# Patient Record
Sex: MALE | Race: WHITE | Employment: OTHER | URBAN - METROPOLITAN AREA
[De-identification: names, ages, dates, MRNs, and addresses within clinical notes are randomized per-mention and may not be internally consistent; named-entity substitution may affect disease eponyms.]

---

## 2018-01-01 ENCOUNTER — HOSPICE ADMISSION (OUTPATIENT)
Dept: HOSPICE | Facility: HOSPICE | Age: 83
End: 2018-01-01
Payer: MEDICARE

## 2018-01-01 ENCOUNTER — HOSPITAL ENCOUNTER (INPATIENT)
Age: 83
LOS: 2 days | End: 2018-08-22
Attending: INTERNAL MEDICINE | Admitting: INTERNAL MEDICINE

## 2018-01-01 VITALS
DIASTOLIC BLOOD PRESSURE: 62 MMHG | RESPIRATION RATE: 16 BRPM | TEMPERATURE: 98 F | HEART RATE: 71 BPM | SYSTOLIC BLOOD PRESSURE: 76 MMHG

## 2018-01-01 PROCEDURE — 0656 HSPC GENERAL INPATIENT

## 2018-01-01 PROCEDURE — 3336500001 HSPC ELECTION

## 2018-01-01 PROCEDURE — 74011250636 HC RX REV CODE- 250/636: Performed by: NURSE PRACTITIONER

## 2018-01-01 PROCEDURE — 74011000250 HC RX REV CODE- 250: Performed by: NURSE PRACTITIONER

## 2018-01-01 RX ORDER — FACIAL-BODY WIPES
10 EACH TOPICAL AS NEEDED
Status: DISCONTINUED | OUTPATIENT
Start: 2018-01-01 | End: 2018-01-01 | Stop reason: HOSPADM

## 2018-01-01 RX ORDER — ACETAMINOPHEN 650 MG/1
650 SUPPOSITORY RECTAL
Status: DISCONTINUED | OUTPATIENT
Start: 2018-01-01 | End: 2018-01-01 | Stop reason: HOSPADM

## 2018-01-01 RX ORDER — WARFARIN 1 MG/1
1 TABLET ORAL DAILY
COMMUNITY

## 2018-01-01 RX ORDER — METOPROLOL TARTRATE 25 MG/1
25 TABLET, FILM COATED ORAL 2 TIMES DAILY
COMMUNITY

## 2018-01-01 RX ORDER — FUROSEMIDE 20 MG/1
20 TABLET ORAL DAILY
COMMUNITY

## 2018-01-01 RX ORDER — LORAZEPAM 2 MG/ML
1 INJECTION INTRAMUSCULAR
Status: DISCONTINUED | OUTPATIENT
Start: 2018-01-01 | End: 2018-01-01 | Stop reason: HOSPADM

## 2018-01-01 RX ORDER — HALOPERIDOL 5 MG/ML
2 INJECTION INTRAMUSCULAR
Status: DISCONTINUED | OUTPATIENT
Start: 2018-01-01 | End: 2018-01-01 | Stop reason: HOSPADM

## 2018-01-01 RX ORDER — SODIUM CHLORIDE 0.9 % (FLUSH) 0.9 %
3 SYRINGE (ML) INJECTION AS NEEDED
Status: DISCONTINUED | OUTPATIENT
Start: 2018-01-01 | End: 2018-01-01 | Stop reason: HOSPADM

## 2018-01-01 RX ORDER — MORPHINE SULFATE 2 MG/ML
2 INJECTION, SOLUTION INTRAMUSCULAR; INTRAVENOUS
Status: DISCONTINUED | OUTPATIENT
Start: 2018-01-01 | End: 2018-01-01 | Stop reason: HOSPADM

## 2018-01-01 RX ORDER — SODIUM CHLORIDE 0.9 % (FLUSH) 0.9 %
3 SYRINGE (ML) INJECTION EVERY 12 HOURS
Status: DISCONTINUED | OUTPATIENT
Start: 2018-01-01 | End: 2018-01-01 | Stop reason: HOSPADM

## 2018-01-01 RX ORDER — LORAZEPAM 2 MG/ML
2 INJECTION INTRAMUSCULAR
Status: DISCONTINUED | OUTPATIENT
Start: 2018-01-01 | End: 2018-01-01 | Stop reason: HOSPADM

## 2018-01-01 RX ORDER — GLYCOPYRROLATE 0.2 MG/ML
0.2 INJECTION INTRAMUSCULAR; INTRAVENOUS
Status: DISCONTINUED | OUTPATIENT
Start: 2018-01-01 | End: 2018-01-01 | Stop reason: HOSPADM

## 2018-01-01 RX ADMIN — LORAZEPAM 1 MG: 2 INJECTION, SOLUTION INTRAMUSCULAR; INTRAVENOUS at 06:20

## 2018-01-01 RX ADMIN — SODIUM CHLORIDE, PRESERVATIVE FREE 3 ML: 5 INJECTION INTRAVENOUS at 10:47

## 2018-01-01 RX ADMIN — MORPHINE SULFATE 2 MG: 2 INJECTION, SOLUTION INTRAMUSCULAR; INTRAVENOUS at 10:43

## 2018-01-01 RX ADMIN — LORAZEPAM 1 MG: 2 INJECTION, SOLUTION INTRAMUSCULAR; INTRAVENOUS at 11:09

## 2018-01-01 RX ADMIN — SODIUM CHLORIDE, PRESERVATIVE FREE 3 ML: 5 INJECTION INTRAVENOUS at 21:55

## 2018-01-01 RX ADMIN — LORAZEPAM 1 MG: 2 INJECTION, SOLUTION INTRAMUSCULAR; INTRAVENOUS at 17:19

## 2018-01-01 RX ADMIN — HALOPERIDOL LACTATE 2 MG: 5 INJECTION INTRAMUSCULAR at 18:19

## 2018-01-01 RX ADMIN — SODIUM CHLORIDE, PRESERVATIVE FREE 3 ML: 5 INJECTION INTRAVENOUS at 02:49

## 2018-01-01 RX ADMIN — MORPHINE SULFATE 2 MG: 2 INJECTION, SOLUTION INTRAMUSCULAR; INTRAVENOUS at 17:18

## 2018-01-01 RX ADMIN — MORPHINE SULFATE 2 MG: 2 INJECTION, SOLUTION INTRAMUSCULAR; INTRAVENOUS at 06:21

## 2018-01-01 RX ADMIN — GLYCOPYRROLATE 0.2 MG: 0.2 INJECTION INTRAMUSCULAR; INTRAVENOUS at 10:42

## 2018-01-01 RX ADMIN — MORPHINE SULFATE 2 MG: 2 INJECTION, SOLUTION INTRAMUSCULAR; INTRAVENOUS at 02:48

## 2018-01-01 RX ADMIN — SODIUM CHLORIDE, PRESERVATIVE FREE 3 ML: 5 INJECTION INTRAVENOUS at 17:19

## 2018-01-01 RX ADMIN — HALOPERIDOL LACTATE 2 MG: 5 INJECTION INTRAMUSCULAR at 02:48

## 2018-01-01 RX ADMIN — SODIUM CHLORIDE, PRESERVATIVE FREE 3 ML: 5 INJECTION INTRAVENOUS at 08:48

## 2018-01-01 RX ADMIN — SODIUM CHLORIDE, PRESERVATIVE FREE 3 ML: 5 INJECTION INTRAVENOUS at 21:04

## 2018-01-01 RX ADMIN — MORPHINE SULFATE 2 MG: 2 INJECTION, SOLUTION INTRAMUSCULAR; INTRAVENOUS at 18:18

## 2018-08-20 PROBLEM — R47.01 APHASIA: Status: ACTIVE | Noted: 2018-01-01

## 2018-08-20 PROBLEM — I69.30 SEQUELAE, POST-STROKE: Status: ACTIVE | Noted: 2018-01-01

## 2018-08-20 PROBLEM — G81.91 RIGHT HEMIPARESIS (HCC): Status: ACTIVE | Noted: 2018-01-01

## 2018-08-20 NOTE — H&P
History and Physical    Patient: Nando Wagner MRN: [id number]  SSN: [id number]    YOB: 1913  Age: 80 y.o. Sex: male      Subjective:      Nando Wagner is a 80 y.o. male who has a hospice diagnosis of sequelae of stroke. Hospice associated diagnoses are right hemiparesis, dysphagia, expressive aphasia and healthcare facility acquired left upper lobe pneumonia. Non-associated diagnoses are atrial fibrillation and ataxia. He was sent to the ER from [facility] due to altered mental status. He had been fairly independent prior to this episode. He was found to have right facial droop, right hemiparesis, expressive aphasia, inability to manage secretions with decreased gag reflex. He was on coumadin for atrial fibrillation but his medication had been held due to a high INR. CT scan revealed no hemorrhage but an area in the medial left occipital lobe indicating stroke. His son, who is following his father's advance directives, has elected to forgo further medical treatment and pursue comfort measures with hospice care. Without further treatment, his life expectancy is less than 7 days. Patient admitted [facility] with sequelae of stroke for management of pain, dyspnea and agitation. Past Medical History:   Diagnosis Date    Atrial fibrillation ([address]     CHF (congestive heart failure) (HCC)     Chronic edema     HTN (hypertension)     Pulmonary HTN (HCC)     Stage 3 chronic kidney disease      History reviewed. No pertinent surgical history. Family History   Problem Relation Age of Onset    Family history unknown: Yes     Social History   Substance Use Topics    Smoking status: Never Smoker    Smokeless tobacco: Never Used    Alcohol use No      Prior to Admission medications    Medication Sig Start Date End Date Taking? Authorizing Provider   metoprolol tartrate (LOPRESSOR) 25 mg tablet Take 25 mg by mouth two (2) times a day.    Yes Historical Provider   warfarin (COUMADIN) 1 mg tablet Take 1 mg by mouth daily. Yes Historical Provider   furosemide (LASIX) 20 mg tablet Take 20 mg by mouth daily. Yes Historical Provider        No Known Allergies    Review of Systems:  Review of systems not obtained due to patient factors. Objective:     Vitals:    08/21/18 0332 08/21/18 1459   BP: 105/60 (!) 76/62   Pulse: 78 71   Resp: 14 16   Temp: 98 °F (36.7 °C)         Physical Exam:  GENERAL: unresponsive, mild distress, appears stated age, pale, cachectic  LUNG: Coarse breath sounds with rhonchi and labored respirations. HEART: irregularly irregular rhythm  ABDOMEN: soft, non-tender. Bowel sounds hypoactive. : Clarke catheter with dennis urine noted. EXTREMITIES:  extremities with no cyanosis or edema. + pulses. SKIN: Pale, warm to touch. Scattered abrasions and ecchymotic areas. Peripheral IV patent. NEUROLOGIC: Unresponsive. Unable to follow commands. Right sided hemiparesis. + right babinski. Bedbound.      Assessment:     Hospital Problems  Date Reviewed: 8/20/2018          Codes Class Noted POA    * (Principal)Sequelae, post-stroke ICD-10-CM: I69.30  ICD-9-CM: 438.9  8/20/2018 Unknown        Right hemiparesis ([facility]) ICD-10-CM: G81.91  ICD-9-CM: 342.90  8/20/2018 Unknown        Aphasia ICD-10-CM: R47.01  ICD-9-CM: 784.3  8/20/2018 Yes              Plan:     Current Facility-Administered Medications   Medication Dose Route Frequency    sodium chloride (NS) flush 3 mL  3 mL IntraVENous Q12H    sodium chloride (NS) flush 3 mL  3 mL IntraVENous PRN    haloperidol lactate (HALDOL) injection 2 mg  2 mg SubCUTAneous Q1H PRN    Or    haloperidol lactate (HALDOL) injection 2 mg  2 mg IntraVENous Q1H PRN    acetaminophen (TYLENOL) suppository 650 mg  650 mg Rectal Q3H PRN    bisacodyl (DULCOLAX) suppository 10 mg  10 mg Rectal PRN    haloperidol lactate (HALDOL) injection 2 mg  2 mg SubCUTAneous Q1H PRN    Or    haloperidol lactate (HALDOL) injection 2 mg  2 mg IntraVENous Q1H PRN    glycopyrrolate (ROBINUL) injection 0.2 mg  0.2 mg IntraVENous Q4H PRN    morphine injection 2 mg  2 mg SubCUTAneous Q20MIN PRN    Or    morphine injection 2 mg  2 mg IntraVENous Q20MIN PRN    LORazepam (ATIVAN) injection 2 mg  2 mg IntraVENous Q10MIN PRN    LORazepam (ATIVAN) injection 1 mg  1 mg IntraVENous Q4H PRN     Admitted [facility] with sequelae of stroke for management of pain, dyspnea and agitation. 1. Pain: Morphine as ordered. 2. Dyspnea: Morphine as ordered. Glycopyrrolate prn secretions. Oxygen prn.    3. Agitation: Haloperidol and Lorazepam as ordered. 4. Family/Pt Support: Family at bedside during exam. Medications and plan of care discussed with nursing staff and family. Will continue to monitor for symptoms and adjust medications as needed to maintain patient comfort. PPS 10%. Case discussed with Dr. Zac Mayen.         Signed By: Rafaela Sacks, NP     August 21, 2018

## 2018-08-20 NOTE — PROGRESS NOTES
Pt restless, moaning, groaning, grimacing. Administered morphine and haldol slow iv push. Spoke with pt's sons about symptoms to expect with physical deterioration, pt starting to have apneic episodes lasting up to 20 seconds.

## 2018-08-21 NOTE — PROGRESS NOTES
Problem: Falls - Risk of  Goal: *Absence of Falls  Document Dario Fall Risk and appropriate interventions in the flowsheet. Outcome: Progressing Towards Goal  Fall Risk Interventions:       Mentation Interventions: Bed/chair exit alarm    Medication Interventions: Bed/chair exit alarm                  Problem: Pressure Injury - Risk of  Goal: *Prevention of pressure injury  Document Armani Scale and appropriate interventions in the flowsheet.    Outcome: Progressing Towards Goal  Pressure Injury Interventions:  Sensory Interventions: Assess changes in LOC, Float heels, Check visual cues for pain              Mobility Interventions: Float heels    Nutrition Interventions: Document food/fluid/supplement intake    Friction and Shear Interventions: Foam dressings/transparent film/skin sealants, Lift sheet

## 2018-08-21 NOTE — PROGRESS NOTES
Report received from off going RN.  Patient round.  ID by name and .  Patient resting with eyes closed.  No s/sx of pain.  No distress noted.  RR even and unlabored.  Bed low and locked.  Call bell in reach.  Door open for continuous monitoring.

## 2018-08-21 NOTE — PROGRESS NOTES
Report received from off-going nurse,walking rounds completed. visual identification made, assumed care of pt. Pt resting quietly with eyes closed, no agitation or restlessness, no grimacing or groaning. Pt respirations unlabored. Tab alert in place, rails up x 2, bed in lowest position, safety maintained. FLACC 0.  0847 physical assessment completed, suctioned for moderate amount of thick tan sputum  1043 pt gurgling and dyspneic, administered morphine and glycopyrrolate  1109 administered ativan for agitation, moaning and groaning. 1200 Follow up on prn medication, pt resting in bed with eyes closed, FLACC =0, no s/sx of dyspnea, agitation or n/v. Will continue to monitor. 1500 visitors at bedside singing  9631 0825 suctioned for large amount of tan sputum  1700 suctioned for large amount of thick tan sputum pt moaning and groaning. Pt extremities cold and mottled   1710 called son, Kim Burns, about assessment changes, states they have said their good byes  1717 pt dyspneic, administered morphine and ativan slow iv push.

## 2018-08-22 NOTE — PROGRESS NOTES
Report received from off going RN. Patient round. ID by name and . Patient resting calmly at this time. No s/sx of pain. No distress noted. RR even and unlabored. Bed low and locked. Call bell in reach. Door open for continuous monitoring.